# Patient Record
Sex: FEMALE | Race: WHITE | Employment: FULL TIME | URBAN - METROPOLITAN AREA
[De-identification: names, ages, dates, MRNs, and addresses within clinical notes are randomized per-mention and may not be internally consistent; named-entity substitution may affect disease eponyms.]

---

## 2018-07-13 ENCOUNTER — OFFICE VISIT (OUTPATIENT)
Dept: OBGYN CLINIC | Age: 38
End: 2018-07-13

## 2018-07-13 VITALS
HEIGHT: 66 IN | WEIGHT: 180 LBS | SYSTOLIC BLOOD PRESSURE: 114 MMHG | DIASTOLIC BLOOD PRESSURE: 68 MMHG | BODY MASS INDEX: 28.93 KG/M2

## 2018-07-13 DIAGNOSIS — Z86.711 HISTORY OF PULMONARY EMBOLISM: ICD-10-CM

## 2018-07-13 DIAGNOSIS — Z01.419 ENCOUNTER FOR GYNECOLOGICAL EXAMINATION: Primary | ICD-10-CM

## 2018-07-13 DIAGNOSIS — D68.51 HETEROZYGOUS FACTOR V LEIDEN MUTATION (HCC): ICD-10-CM

## 2018-07-13 DIAGNOSIS — N94.89 SUPPRESSION OF MENSES: ICD-10-CM

## 2018-07-13 RX ORDER — AZELASTINE HYDROCHLORIDE, FLUTICASONE PROPIONATE 137; 50 UG/1; UG/1
1 SPRAY, METERED NASAL 2 TIMES DAILY
COMMUNITY

## 2018-07-13 NOTE — PROGRESS NOTES
Annual exam ages 21-44    [address] [facility],[address] is a ,  41 y.o. female [facility] No LMP recorded. Patient is not currently having periods (Reason: IUD). .    She presents for her annual checkup. She is having no significant problems. With regard to the Gardasil vaccine, she is older than the FDA approved age to receive it. Menstrual status:    Her periods are nonexistent in flow. She denies dysmenorrhea. She reports no premenstrual symptoms. Contraception:    The current method of family planning is IUD. Mirena is due to be removed 2018. Pt desires to have it replaced. Sexual history:     She  reports that she currently engages in sexual activity and has had male partners. She reports using the following method of birth control/protection: IUD. Brenda Bellamy Medical conditions:    Since her last annual GYN exam was 18, she has not the following changes in her health history: none. Pap and Mammogram History:    Her most recent Pap smear was normal, HPV neg, obtained 12. The patient has never had a mammogram.    Breast Cancer History/Substance Abuse: negative    Past Medical History:   Diagnosis Date    Depression     Factor 5 Leiden mutation, heterozygous ([facility]     ITP (idiopathic thrombocytopenic purpura)     IUD (intrauterine device) in place     13 mirena    Postpartum depression     Pulmonary emboli (HCC)     Thromboembolus ([facility]     Unspecified diseases of blood and blood-forming organs      Past Surgical History:   Procedure Laterality Date    HX CYST REMOVAL      HX WISDOM TEETH EXTRACTION      HX WRIST FRACTURE TX         Current Outpatient Prescriptions   Medication Sig Dispense Refill    azelastine-fluticasone (DYMISTA) 137-50 mcg/spray spry by Nasal route.  sertraline (ZOLOFT) 100 mg tablet Take  by mouth daily. Allergies: Claritin [loratadine]; Ceftin [cefuroxime axetil];  Cefuroxime; Morphine; and Percocet [oxycodone-acetaminophen] Tobacco History:  reports that she has never smoked. She has never used smokeless tobacco.  Alcohol Abuse:  reports that she does not drink alcohol. Drug Abuse:  reports that she does not use illicit drugs.     Family Medical/Cancer History:   Family History   Problem Relation Age of Onset    Cancer Mother      breast    Cancer Maternal Aunt      breast        Review of Systems - History obtained from the patient  Constitutional: negative for weight loss, fever, night sweats  HEENT: negative for hearing loss, earache, congestion, snoring, sorethroat  CV: negative for chest pain, palpitations, edema  Resp: negative for cough, shortness of breath, wheezing  GI: negative for change in bowel habits, abdominal pain, black or bloody stools  : negative for frequency, dysuria, hematuria, vaginal discharge  MSK: negative for back pain, joint pain, muscle pain  Breast: negative for breast lumps, nipple discharge, galactorrhea  Skin :negative for itching, rash, hives  Neuro: negative for dizziness, headache, confusion, weakness  Psych: negative for anxiety, depression, change in mood  Heme/lymph: negative for bleeding, bruising, pallor    Physical Exam    Visit Vitals    /68    Ht 5' 6\" (1.676 m)    Wt 180 lb (81.6 kg)    BMI 29.05 kg/m2       Constitutional  · Appearance: well-nourished, well developed, alert, in no acute distress    HENT  · Head and Face: appears normal    Neck  · Inspection/Palpation: normal appearance, no masses or tenderness  · Lymph Nodes: no lymphadenopathy present  · Thyroid: gland size normal, nontender, no nodules or masses present on palpation    Chest  · Respiratory Effort: breathing unlabored  · Auscultation: normal breath sounds    Cardiovascular  · Heart:  · Auscultation: regular rate and rhythm without murmur    Breasts  · Inspection of Breasts: breasts symmetrical, no skin changes, no discharge present, nipple appearance normal, no skin retraction present  · Palpation of Breasts and Axillae: no masses present on palpation, no breast tenderness  · Axillary Lymph Nodes: no lymphadenopathy present    Gastrointestinal  · Abdominal Examination: abdomen non-tender to palpation, normal bowel sounds, no masses present  · Liver and spleen: no hepatomegaly present, spleen not palpable  · Hernias: no hernias identified    Genitourinary  · External Genitalia: normal appearance for age, no discharge present, no tenderness present, no inflammatory lesions present, no masses present, no atrophy present  · Vagina: normal vaginal vault without central or paravaginal defects, no discharge present, no inflammatory lesions present, no masses present  · Bladder: non-tender to palpation  · Urethra: appears normal  · Cervix: normal   · Uterus: normal size, shape and consistency  · Adnexa: no adnexal tenderness present, no adnexal masses present  · Perineum: perineum within normal limits, no evidence of trauma, no rashes or skin lesions present  · Anus: anus within normal limits, no hemorrhoids present  · Inguinal Lymph Nodes: no lymphadenopathy present    Skin  · General Inspection: no rash, no lesions identified    Neurologic/Psychiatric  · Mental Status:  · Orientation: grossly oriented to person, place and time  · Mood and Affect: mood normal, affect appropriate    . Assessment:  Routine gynecologic examination  Her current medical status is satisfactory with no evidence of significant gynecologic issues.   Hx of DVT/PE  Suppression of menses  Plan:  Counseled re: diet, exercise, healthy lifestyle  Return for yearly wellness visits  Pap/HPV  Switch out Mirena next month

## 2018-07-18 LAB
CYTOLOGIST CVX/VAG CYTO: NORMAL
CYTOLOGY CVX/VAG DOC THIN PREP: NORMAL
DX ICD CODE: NORMAL
HPV I/H RISK 1 DNA CVX QL PROBE+SIG AMP: NEGATIVE
Lab: NORMAL
OTHER STN SPEC: NORMAL
PATH REPORT.FINAL DX SPEC: NORMAL
STAT OF ADQ CVX/VAG CYTO-IMP: NORMAL

## 2018-07-26 ENCOUNTER — OFFICE VISIT (OUTPATIENT)
Dept: OBGYN CLINIC | Age: 38
End: 2018-07-26

## 2018-07-26 ENCOUNTER — TELEPHONE (OUTPATIENT)
Dept: OBGYN CLINIC | Age: 38
End: 2018-07-26

## 2018-07-26 VITALS — WEIGHT: 180.2 LBS | HEIGHT: 66 IN | BODY MASS INDEX: 28.96 KG/M2

## 2018-07-26 DIAGNOSIS — Z30.433 ENCOUNTER FOR IUD REMOVAL AND REINSERTION: Primary | ICD-10-CM

## 2018-07-26 NOTE — PATIENT INSTRUCTIONS
Learning About Birth Control: Intrauterine Device (IUD)  What is an intrauterine device (IUD)? The intrauterine device (IUD) is used to prevent pregnancy. It's a small, plastic, T-shaped device. Your doctor places the IUD in your uterus. You have a choice between a hormonal IUD and a copper IUD. The hormonal IUD can prevent pregnancy for 3 to 5 years, depending on which IUD is used. Once you have it, you don't have to do anything else to prevent pregnancy. The copper IUD can prevent pregnancy for 10 years. Once you have it, you don't have to do anything else to prevent pregnancy. A string tied to the end of the IUD hangs down through the opening of the uterus (called the cervix) into the vagina. You can check that the IUD is in place by feeling for the string. The IUD usually stays in the uterus until your doctor removes it. How well does it work? In the first year of use:  · When the hormonal IUD is used exactly as directed, fewer than 1 woman out of 100 has an unplanned pregnancy. · When the copper IUD is used exactly as directed, fewer than 1 woman out of 100 has an unplanned pregnancy. Be sure to tell your doctor about any health problems you have or medicines you take. He or she can help you choose the birth control method that is right for you. What are the advantages of an IUD? · An IUD is one of the most effective methods of birth control. · It prevents pregnancy for 3 to 10 years, depending on the type. You don't have to worry about birth control during this time. · It's safe to use while breastfeeding. · IUDs don't contain estrogen. So you can use an IUD if you don't want to take estrogen or can't take estrogen because you have certain health problems or concerns. · An IUD is convenient. It is always providing birth control. You don't need to remember to take a pill or get a shot. You don't have to interrupt sex to protect against pregnancy.   · A hormonal IUD may reduce heavy bleeding and cramping. What are the disadvantages of an IUD? · An IUD doesn't protect against sexually transmitted infections (STIs), such as herpes or HIV/AIDS. If you aren't sure if your sex partner might have an STI, use a condom to protect against disease. · A copper IUD may cause periods with more bleeding and cramping. · You have to see a doctor to have an IUD inserted and removed. · You have to check to see if the string is in place. Where can you learn more? Go to http://piotr-christina.info/. Enter T994 in the search box to learn more about \"Learning About Birth Control: Intrauterine Device (IUD). \"  Current as of: November 21, 2017  Content Version: 11.7  © 3761-3614 [employer]. Care instructions adapted under license by [employer] (which disclaims liability or warranty for this information). If you have questions about a medical condition or this instruction, always ask your healthcare professional. [address] any warranty or liability for your use of this information.

## 2018-07-26 NOTE — TELEPHONE ENCOUNTER
Message left at 9:20am      41year old patient last seen in the office on 7/13/18. Patient calling to ask what medication to take prior to her IUD insertion. This nurse called the patient and advised to take two Aleve one hour prior to the procedure if not allergic to the medication. Patient verbalized understanding.

## 2018-07-26 NOTE — PROGRESS NOTES
[facility]  OFFICE PROCEDURE PROGRESS NOTE        Chart reviewed for the following:   Nikki Bravo LPN, have reviewed the History, Physical and updated the Allergic reactions for [address] performed immediately prior to start of procedure:   IHuma LPN, have performed the following reviews on [address] [place] [address],[address] [place] [address] prior to the start of the procedure:            * Patient was identified by name and date of birth   * Agreement on procedure being performed was verified  * Risks and Benefits explained to the patient  * Procedure site verified and marked as necessary  * Patient was positioned for comfort  * Consent was signed and verified     Time: 3:49 PM        Date of procedure: 2018    Procedure performed by:  Magui Gunderson MD    How tolerated by patient: tolerated the procedure well with no complications    Post Procedural Pain Scale: 2 - Hurts Little Bit    Comments: none  IUD REMOVAL  Indications for Removal:  [address] [place] [address],[address] [place] [address] is a ,  41 y.o. female [facility] whose No LMP recorded. Patient is not currently having periods (Reason: IUD). Katarzyna Kruse who presents today for IUD removal. Her current IUD was placed years ago. She has not had  problems with the IUD. She requests removal of the IUD because the IUD effectiveness has . The IUD removal procedure was discussed with the patient and she had no further questions. Procedure: The patient was placed in a dorsal lithotomy position and appropriately draped. On bimanual exam the uterus was anterior and normal in size with no tenderness present. A speculum exam was performed and the cervix was visualized. The cervix was prepped with zephiran solution. The IUD string was visualized. Using ring forceps , the string was grasped and the IUD removed intact. The IUD was shown to the patient. Mirena IUD INSERTION  Indications:  [address] [place] [address],[address] [place] [address] is a ,  41 y.o. female [facility] No LMP recorded. Patient is not currently having periods (Reason: IUD). Her LMP was normal in duration and amount of flow. She  presents for insertion of an IUD. The risks, benefits and alternatives of IUD insertion were discussed in detail at last visit. She also has reviewed Mirena information. She has elected to proceed with the insertion today and she states she has no further questions. Procedure: The pelvic exam revealed normal external genitalia. On bimanual exam the uterus was anteverted and normal in size with no tenderness present. A speculum was inserted into the vagina and the cervix was visualized. The cervix was prepped with zephiran solution. The anterior lip of the cervix was grasped with a single toothed tenaculum. The uterus was sounded with a Real sound to 10 centimeters. A Mirena was then inserted without difficulty. The string was cut to 3 centimeters. She experienced a mild  amount of cramping. Post Procedure Status:   She tolerated the procedure with mild discomfort. The patient was observed for 10 minutes after the insertion. There were no complications. Patient was discharged in stable condition. The patient received Mirena lot number [id number].     Disc bleeding, infection, expulsion  FU with US in 4 weeks

## 2018-07-27 ENCOUNTER — OFFICE VISIT (OUTPATIENT)
Dept: INTERNAL MEDICINE CLINIC | Age: 38
End: 2018-07-27

## 2018-07-27 VITALS
WEIGHT: 179 LBS | TEMPERATURE: 98.2 F | OXYGEN SATURATION: 98 % | RESPIRATION RATE: 18 BRPM | BODY MASS INDEX: 28.77 KG/M2 | DIASTOLIC BLOOD PRESSURE: 65 MMHG | HEART RATE: 92 BPM | HEIGHT: 66 IN | SYSTOLIC BLOOD PRESSURE: 112 MMHG

## 2018-07-27 DIAGNOSIS — D68.51 HETEROZYGOUS FACTOR V LEIDEN MUTATION (HCC): ICD-10-CM

## 2018-07-27 DIAGNOSIS — Z86.718 HISTORY OF THROMBOEMBOLISM: ICD-10-CM

## 2018-07-27 DIAGNOSIS — Z80.3 FH: BREAST CANCER IN FIRST DEGREE RELATIVE: ICD-10-CM

## 2018-07-27 DIAGNOSIS — Z86.2 HISTORY OF ITP: ICD-10-CM

## 2018-07-27 DIAGNOSIS — Z00.00 PREVENTATIVE HEALTH CARE: Primary | ICD-10-CM

## 2018-07-27 DIAGNOSIS — Z91.018 FOOD ALLERGY: ICD-10-CM

## 2018-07-27 RX ORDER — RIVAROXABAN 20 MG/1
20 TABLET, FILM COATED ORAL
Qty: 30 TAB | Refills: 0
Start: 2018-07-27

## 2018-07-27 NOTE — PROGRESS NOTES
Brandon Perkins is a 41 y.o. female New patient to my practice, referred to me by self.   her prior medical care has been from [facility] who went part-time. she works as [employer] . 
Presenting for her annual checkup and follow-up She is followed closely by GYN Dr. Yoli Sherwood. Had Mirena placed yesterday. She is  to Yasmine Ellison, has 2 children. Seeing a nutritionist and has lost 30 lb. Still, was told nutritionist asks if \"something else is going on\" since she has not lost enough. \"Stroger You\". On calorie-restricted diet based on her activity She has a history of thromboembolism ×2. Heterozygous factor V Leiden mutation. First episode was in 2008 while she was on oral contraceptive therapy. Second episode was in 2012 when she was traveling well taking prophylactic low-dose of Lovenox. Consulted Dr. Mee Angelo at [address] [place] [address] and now is not on chronic anticoagulation therapy, but takes Xarelto with travel only. She does not smoke and has avoided hormonal contraception since that time. Denies any symptoms of concern today. She does have a history of ITP. Unclear origin, but did start Claritin around the time that it began that she is aware that medication. Last breast exam: last week Last PAP/pelvic: last week Last colonoscopy: none Last DEXA:  
Health Maintenance Topic Date Due  Influenza Age 5 to Adult  08/01/2018  DTaP/Tdap/Td series (2 - Td) 05/01/2023  PAP AKA CERVICAL CYTOLOGY  07/13/2023 Exercise: very active Diet: generally follows a low fat low cholesterol diet Vaccinations reviewed Immunization History Administered Date(s) Administered  Tdap 05/01/2013 Allergies: Claritin [loratadine]; Ceftin [cefuroxime axetil]; Cefuroxime; and Percocet [oxycodone-acetaminophen] Current Outpatient Prescriptions Medication Sig  XARELTO 20 mg tab tablet Take 1 Tab by mouth daily (with breakfast). As needed for travel. Per Dr. Mee Agnelo.   
 azelastine-fluticasone (DYMISTA) 137-50 mcg/spray spry 1 Spray by Nasal route two (2) times a day. No current facility-administered medications for this visit. has a past medical history of Factor 5 Leiden mutation, heterozygous ([facility] FH: breast cancer in first degree relative; Food allergy; History of ITP (1998); History of thromboembolism; IUD (intrauterine device) in place; Postpartum depression; Pulmonary emboli ([facility] (09/2008); Right leg DVT ([facility] (10/2012); and Spontaneous vaginal delivery. She also has no past medical history of Systemic lupus erythematosus ([facility] Past Surgical History:  
Procedure Laterality Date  HX CYST REMOVAL Left   
 left wrist ganglion  HX WISDOM TEETH EXTRACTION Social History Social History  Marital status:  Spouse name: Margaret Frederick  Number of children: 2  
 Years of education: N/A Occupational History [place] [id number] Social History Main Topics  Smoking status: Never Smoker  Smokeless tobacco: Never Used  Alcohol use No  
 Drug use: No  
 Sexual activity: Yes  
  Partners: Male Birth control/ protection: IUD Other Topics Concern  Not on file Social History Narrative Family History Problem Relation Age of Onset  Cancer Mother   
  breast.  2x age 64, 70  
 Cancer Maternal Aunt   
  breast  
 Prostate Cancer Maternal Grandfather  Strabismus Paternal Grandfather  Stroke Paternal Grandfather Review of Systems - History obtained from the patient General ROS: negative for - night sweats, weight gain or weight loss Physical exam 
Blood pressure 145/81, pulse 92, temperature 98.2 °F (36.8 °C), temperature source Oral, resp. rate 18, height 5' 6\" (1.676 m), weight 179 lb (81.2 kg), SpO2 98 %. Wt Readings from Last 3 Encounters:  
07/27/18 179 lb (81.2 kg) 07/26/18 180 lb 3.2 oz (81.7 kg) 07/13/18 180 lb (81.6 kg) she appears well, alert and oriented x 3, pleasant and cooperative. Vitals as noted. No rashes or significant lesions. Neck supple and free of adenopathy, or masses. No thyromegaly or carotid bruits. Cranial nerves normal. Lungs are clear to auscultation. Heart sounds are normal with no murmurs, clicks, gallops or rubs. Abdomen is soft, non- tender, with no masses or organomegaly. Extremities, peripheral pulses and reflexes are normal.  . 
 
Diagnoses and all orders for this visit: 1. Preventative health care -     LIPID PANEL 
-     CBC WITH AUTOMATED DIFF 
-     METABOLIC PANEL, COMPREHENSIVE 
-     TSH 3RD GENERATION 
-     VITAMIN B12 
-     VITAMIN D, 25 HYDROXY 2. Heterozygous factor V Leiden mutation ([facility] 3. History of thromboembolism Asymptomatic. Using Xarelto as needed. Follow-up with Dr. Momo Reid. -     XARELTO 20 mg tab tablet; Take 1 Tab by mouth daily (with breakfast). As needed for travel. Per Dr. Momo Reid. 4. FH: breast cancer in first degree relative Mother and maternal aunt. She will ask them if they had any genetic testing. Start screening mammograms at age 38. Both of her relatives were diagnosed at the age of 61. 
 
11. History of ITP Asymptomatic. Repeat CBC 6. Food allergy Several food allergies. Followed up by allergist.  Check labs as above to look for any evidence of malabsorption. She is on somewhat of a restrictive diet but is doing well. The patient is asked to make an attempt to improve diet and exercise patterns Return for yearly wellness visits

## 2018-07-27 NOTE — PATIENT INSTRUCTIONS

## 2018-07-27 NOTE — MR AVS SNAPSHOT
[address] [facility] 
 
 
 [address] Tejolyn Pee 1007 [facility] 
[phone] Patient: Jessica Crain MRN: [id number] Visit Information Date & Time Provider Department Dept. Phone Encounter #  
 7/27/2018  1:45 PM Asha Perez MD [facility] [address] [id number] Your Appointments 8/23/2018  2:30 PM  
ULTRASOUND with DOUG Aaron ([address]) Appt Note: to see [address] after US for Mirena check [address] [place] [address]  
[phone]  
  
   
 [address]  
  
    
 8/23/2018  2:50 PM  
ESTABLISHED PATIENT with Pennie Crowley MD  
[facility] ([address]) Appt Note: to see [address] after US for Mirena check 2505 [facility] [address] [facility] [address] [facility] [place]alee Pichardo [address]  
[facility] [address] Upcoming Health Maintenance Date Due Influenza Age 5 to Adult 8/1/2018 DTaP/Tdap/Td series (2 - Td) 5/1/2023 PAP AKA CERVICAL CYTOLOGY 7/13/2023 Allergies as of 7/27/2018  Review Complete On: 7/27/2018 By: Asha Perez MD  
  
 Severity Noted Reaction Type Reactions Claritin [Loratadine] High 10/15/2012   Intolerance Unknown (comments) Pt had ITP in 1998. Pt stated that she believes it was from the claritin. Ceftin [Cefuroxime Axetil]  09/27/2012    Nausea Only Cefuroxime  11/02/2017    Nausea and Vomiting Migraines Percocet [Oxycodone-acetaminophen]  09/27/2012    Nausea Only Severe nausea Current Immunizations  Reviewed on 7/27/2018 Name Date Tdap 5/1/2013 12:10 PM  
  
 Reviewed by Asha Perez MD on 7/27/2018 at  2:29 PM  
You Were Diagnosed With   
  
 Codes Comments Preventative health care    -  Primary ICD-10-CM: Z00.00 ICD-9-CM: V70.0 Heterozygous factor V Leiden mutation [facility])     ICD-10-CM: J39.68 
ICD-9-CM: 289.81 History of thromboembolism     ICD-10-CM: V32.975 ICD-9-CM: V12.51 FH: breast cancer in first degree relative     ICD-10-CM: Z80.3 ICD-9-CM: V16.3 History of ITP     ICD-10-CM: Z86.2 ICD-9-CM: V12.3 Food allergy     ICD-10-CM: M59.856 
ICD-9-CM: V15.05 Vitals BP Pulse Temp Resp Height(growth percentile) Weight(growth percentile) 112/65 (BP 1 Location: Left arm, BP Patient Position: Sitting) 92 98.2 °F (36.8 °C) (Oral) 18 5' 6\" (1.676 m) 179 lb (81.2 kg) SpO2 BMI OB Status Smoking Status 98% 28.89 kg/m2 IUD Never Smoker Vitals History BMI and BSA Data Body Mass Index Body Surface Area  
 28.89 kg/m 2 1.94 m 2 Preferred Pharmacy Pharmacy Name Phone [address], Stella Elmer [address] AT [facility]  [facility] [phone] Your Updated Medication List  
  
   
This list is accurate as of 7/27/18  2:33 PM.  Always use your most recent med list.  
  
  
  
  
 Jes Edgardo 137-50 mcg/spray Spry Generic drug:  azelastine-fluticasone 1 Spray by Nasal route two (2) times a day. XARELTO 20 mg Tab tablet Generic drug:  rivaroxaban Take 1 Tab by mouth daily (with breakfast). As needed for travel. Per Dr. Elizabet Slaughter. We Performed the Following CBC WITH AUTOMATED DIFF [89414 CPT(R)] LIPID PANEL [92894 CPT(R)] METABOLIC PANEL, COMPREHENSIVE [70662 CPT(R)] TSH 3RD GENERATION [28050 CPT(R)] VITAMIN B12 [id number] CPT(R)] VITAMIN D, 25 HYDROXY [id number] CPT(R)] Patient Instructions Well Visit, Ages 25 to 48: Care Instructions Your Care Instructions Physical exams can help you stay healthy. Your doctor has checked your overall health and may have suggested ways to take good care of yourself. He or she also may have recommended tests.  At home, you can help prevent illness with healthy eating, regular exercise, and other steps. Follow-up care is a key part of your treatment and safety. Be sure to make and go to all appointments, and call your doctor if you are having problems. It's also a good idea to know your test results and keep a list of the medicines you take. How can you care for yourself at home? · Reach and stay at a healthy weight. This will lower your risk for many problems, such as obesity, diabetes, heart disease, and high blood pressure. · Get at least 30 minutes of physical activity on most days of the week. Walking is a good choice. You also may want to do other activities, such as running, swimming, cycling, or playing tennis or team sports. Discuss any changes in your exercise program with your doctor. · Do not smoke or allow others to smoke around you. If you need help quitting, talk to your doctor about stop-smoking programs and medicines. These can increase your chances of quitting for good. · Talk to your doctor about whether you have any risk factors for sexually transmitted infections (STIs). Having one sex partner (who does not have STIs and does not have sex with anyone else) is a good way to avoid these infections. · Use birth control if you do not want to have children at this time. Talk with your doctor about the choices available and what might be best for you. · Protect your skin from too much sun. When you're outdoors from 10 a.m. to 4 p.m., stay in the shade or cover up with clothing and a hat with a wide brim. Wear sunglasses that block UV rays. Even when it's cloudy, put broad-spectrum sunscreen (SPF 30 or higher) on any exposed skin. · See a dentist one or two times a year for checkups and to have your teeth cleaned. · Wear a seat belt in the car. · Drink alcohol in moderation, if at all. That means no more than 2 drinks a day for men and 1 drink a day for women. Follow your doctor's advice about when to have certain tests. These tests can spot problems early. For everyone · Cholesterol. Have the fat (cholesterol) in your blood tested after age 21. Your doctor will tell you how often to have this done based on your age, family history, or other things that can increase your risk for heart disease. · Blood pressure. Have your blood pressure checked during a routine doctor visit. Your doctor will tell you how often to check your blood pressure based on your age, your blood pressure results, and other factors. · Vision. Talk with your doctor about how often to have a glaucoma test. 
· Diabetes. Ask your doctor whether you should have tests for diabetes. · Colon cancer. Have a test for colon cancer at age 48. You may have one of several tests. If you are younger than 48, you may need a test earlier if you have any risk factors. Risk factors include whether you already had a precancerous polyp removed from your colon or whether your parent, brother, sister, or child has had colon cancer. For women · Breast exam and mammogram. Talk to your doctor about when you should have a clinical breast exam and a mammogram. Medical experts differ on whether and how often women under 50 should have these tests. Your doctor can help you decide what is right for you. · Pap test and pelvic exam. Begin Pap tests at age 24. A Pap test is the best way to find cervical cancer. The test often is part of a pelvic exam. Ask how often to have this test. 
· Tests for sexually transmitted infections (STIs). Ask whether you should have tests for STIs. You may be at risk if you have sex with more than one person, especially if your partners do not wear condoms. For men · Tests for sexually transmitted infections (STIs). Ask whether you should have tests for STIs. You may be at risk if you have sex with more than one person, especially if you do not wear a condom. · Testicular cancer exam. Ask your doctor whether you should check your testicles regularly. · Prostate exam. Talk to your doctor about whether you should have a blood test (called a PSA test) for prostate cancer. Experts differ on whether and when men should have this test. Some experts suggest it if you are older than 39 and are -American or have a father or brother who got prostate cancer when he was younger than 72. When should you call for help? Watch closely for changes in your health, and be sure to contact your doctor if you have any problems or symptoms that concern you. Where can you learn more? Go to http://piotr-christina.info/. Enter P072 in the search box to learn more about \"Well Visit, Ages 25 to 48: Care Instructions. \" Current as of: May 16, 2017 Content Version: 11.7 © 6930-8822 [employer]. Care instructions adapted under license by [employer] (which disclaims liability or warranty for this information). If you have questions about a medical condition or this instruction, always ask your healthcare professional. [place] [address] any warranty or liability for your use of this information. Introducing [facility] & HE[facility]! Dear Silke Strange: 
Thank you for requesting a [employer] account. Our records indicate that you already have an active [employer] account. You can access your account anytime at [email or web address]. [place] [facility]/[employer] Did you know that you can access your hospital and ER discharge instructions at any time in [employer]? You can also review all of your test results from your hospital stay or ER visit. Additional Information If you have questions, please visit the Frequently Asked Questions section of the [employer] website at [email or web address]. [place] [facility]/[employer]/. Remember, [employer] is NOT to be used for urgent needs. For medical emergencies, dial 911. Now available from your iPhone and Android! Please provide this summary of care documentation to your next provider. Your primary care clinician is listed as Miguel Ángel Dejesus. If you have any questions after today's visit, please call [phone].

## 2018-07-28 LAB
25(OH)D3+25(OH)D2 SERPL-MCNC: 33.1 NG/ML (ref 30–100)
ALBUMIN SERPL-MCNC: 4.1 G/DL (ref 3.5–5.5)
ALBUMIN/GLOB SERPL: 1.6 {RATIO} (ref 1.2–2.2)
ALP SERPL-CCNC: 40 IU/L (ref 39–117)
ALT SERPL-CCNC: 12 IU/L (ref 0–32)
AST SERPL-CCNC: 17 IU/L (ref 0–40)
BASOPHILS # BLD AUTO: 0 X10E3/UL (ref 0–0.2)
BASOPHILS NFR BLD AUTO: 0 %
BILIRUB SERPL-MCNC: 0.5 MG/DL (ref 0–1.2)
BUN SERPL-MCNC: 15 MG/DL (ref 6–20)
BUN/CREAT SERPL: 17 (ref 9–23)
CALCIUM SERPL-MCNC: 9 MG/DL (ref 8.7–10.2)
CHLORIDE SERPL-SCNC: 104 MMOL/L (ref 96–106)
CHOLEST SERPL-MCNC: 170 MG/DL (ref 100–199)
CO2 SERPL-SCNC: 23 MMOL/L (ref 20–29)
CREAT SERPL-MCNC: 0.88 MG/DL (ref 0.57–1)
EOSINOPHIL # BLD AUTO: 0.1 X10E3/UL (ref 0–0.4)
EOSINOPHIL NFR BLD AUTO: 1 %
ERYTHROCYTE [DISTWIDTH] IN BLOOD BY AUTOMATED COUNT: 13.3 % (ref 12.3–15.4)
GLOBULIN SER CALC-MCNC: 2.5 G/DL (ref 1.5–4.5)
GLUCOSE SERPL-MCNC: 81 MG/DL (ref 65–99)
HCT VFR BLD AUTO: 40.9 % (ref 34–46.6)
HDLC SERPL-MCNC: 63 MG/DL
HGB BLD-MCNC: 13.7 G/DL (ref 11.1–15.9)
IMM GRANULOCYTES # BLD: 0 X10E3/UL (ref 0–0.1)
IMM GRANULOCYTES NFR BLD: 0 %
INTERPRETATION, 910389: NORMAL
LDLC SERPL CALC-MCNC: 98 MG/DL (ref 0–99)
LYMPHOCYTES # BLD AUTO: 2.2 X10E3/UL (ref 0.7–3.1)
LYMPHOCYTES NFR BLD AUTO: 31 %
MCH RBC QN AUTO: 31.4 PG (ref 26.6–33)
MCHC RBC AUTO-ENTMCNC: 33.5 G/DL (ref 31.5–35.7)
MCV RBC AUTO: 94 FL (ref 79–97)
MONOCYTES # BLD AUTO: 0.3 X10E3/UL (ref 0.1–0.9)
MONOCYTES NFR BLD AUTO: 5 %
NEUTROPHILS # BLD AUTO: 4.3 X10E3/UL (ref 1.4–7)
NEUTROPHILS NFR BLD AUTO: 63 %
PLATELET # BLD AUTO: 197 X10E3/UL (ref 150–379)
POTASSIUM SERPL-SCNC: 4.2 MMOL/L (ref 3.5–5.2)
PROT SERPL-MCNC: 6.6 G/DL (ref 6–8.5)
RBC # BLD AUTO: 4.36 X10E6/UL (ref 3.77–5.28)
SODIUM SERPL-SCNC: 142 MMOL/L (ref 134–144)
TRIGL SERPL-MCNC: 46 MG/DL (ref 0–149)
TSH SERPL DL<=0.005 MIU/L-ACNC: 1.36 UIU/ML (ref 0.45–4.5)
VIT B12 SERPL-MCNC: 799 PG/ML (ref 232–1245)
VLDLC SERPL CALC-MCNC: 9 MG/DL (ref 5–40)
WBC # BLD AUTO: 6.9 X10E3/UL (ref 3.4–10.8)

## 2018-09-04 ENCOUNTER — OFFICE VISIT (OUTPATIENT)
Dept: OBGYN CLINIC | Age: 38
End: 2018-09-04

## 2018-09-04 VITALS
DIASTOLIC BLOOD PRESSURE: 66 MMHG | HEIGHT: 66 IN | BODY MASS INDEX: 28.77 KG/M2 | WEIGHT: 179 LBS | SYSTOLIC BLOOD PRESSURE: 108 MMHG

## 2018-09-04 DIAGNOSIS — Z30.431 IUD CHECK UP: Primary | ICD-10-CM

## 2018-09-04 NOTE — PROGRESS NOTES
This is a follow-up visit for Michaela Lorenzo is a ,  41 y.o. female [facility] whose No LMP recorded. Patient is not currently having periods (Reason: IUD). was on . She had an Mirena IUD placed six weeks ago. Since the IUD placement, the patient has not had any unusual complaints. She has had some mild non-menstrual bleeding. She describes having light amount of blood-tinged discharge which occurred at the insertion of the Mirena. She has not had any significant  pain. She has had no fever. Associated signs and symptoms: she denies dyspareunia, expulsion, heavy bleeding, increased pain, fever, and pelvic pain. Ultrasound was done today and revealed appropriate placement of the IUD in the endometrial cavity. TRANSVAGINAL ULTRASOUND PERFORMED  UTERUS IS ANTEVERTED, NORMAL IN SIZE AND ECHOGENICITY. ENDOMETRIUM MEASURES 2-3MM IN THICKNESS. NO EVIDENCE OF MASSES OR ABNORMALITIES ARE SEEN. IUD IS SEEN IN THE PROPER POSITION WITHIN THE ENDOMETRIAL CAVITY IN THE UTERINE FUNDUS. RIGHT OVARY APPEARS WITHIN NORMAL LIMITS. LEFT OVARY APPEARS WITHIN NORMAL LIMITS. A FOLLICLE IS SEEN TODAY. NO FREE FLUID SEEN IN THE CDS. Past Medical History:   Diagnosis Date    Factor 5 Leiden mutation, heterozygous ([address]     single mutation. consulted Dr. Kennedi Franco at [facility]  and Dr. Emi Waddell FH: breast cancer in first degree relative     mother 58, 70. [facility] allergy     Dr. Alanis Wakefield. 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 tx gamma globulin    History of thromboembolism      on OCP,  while pregnant. now uses Xarelto prn travel    IUD (intrauterine device) in place 2018      Dr. Monae Allen. mirena    Postpartum depression     Pulmonary emboli ([address] 2008    due to OCP    Right leg DVT ([address] 10/2012    while pregnant and traveling while taking low-dose lovenox.       Spontaneous vaginal delivery     2010     Past Surgical History:   Procedure Laterality Date    HX CYST REMOVAL Left     left wrist ganglion    HX WISDOM TEETH EXTRACTION       Social History     Occupational History    [employer]     Social History Main Topics    Smoking status: Never Smoker    Smokeless tobacco: Never Used    Alcohol use No    Drug use: No    Sexual activity: Yes     Partners: Male     Birth control/ protection: IUD     Family History   Problem Relation Age of Onset    Cancer Mother 64     breast.  2x age 64, 70    Breast Cancer Maternal Aunt 61    Prostate Cancer Maternal Grandfather     Strabismus Paternal Grandfather     Stroke Paternal Grandfather     Breast Cancer Maternal Aunt 61     \"aggressive type\"       Allergies   Allergen Reactions    Claritin [Loratadine] Unknown (comments)     Pt had ITP in 1998. Pt stated that she believes it was from the claritin.  Ceftin [Cefuroxime Axetil] Nausea Only    Cefuroxime Nausea and Vomiting     Migraines    Percocet [Oxycodone-Acetaminophen] Nausea Only     Severe nausea     Prior to Admission medications    Medication Sig Start Date End Date Taking? Authorizing Provider   XARELTO 20 mg tab tablet Take 1 Tab by mouth daily (with breakfast). As needed for travel. Per Dr. Alayna Ayala. 7/27/18  Yes Joel Collins MD   azelastine-fluticasone [facility]) 137-50 mcg/spray spry 1 Spray by Nasal route two (2) times a day.    Yes Historical Provider        Review of Systems: History obtained from the patient  Constitutional: negative for weight loss, fever, night sweats  Breast: negative for breast lumps, nipple discharge, galactorrhea  GI: negative for change in bowel habits, abdominal pain, black or bloody stools  : negative for frequency, dysuria, hematuria, vaginal discharge  MSK: negative for back pain, joint pain, muscle pain  Skin: negative for itching, rash, hives  Psych: negative for anxiety, depression, change in mood      Objective:  Visit Vitals    /66    Ht 5' 6\" (1.676 m)    Wt 179 lb (81.2 kg)    BMI 28.89 kg/m2       Physical Exam:   PHYSICAL EXAMINATION    Constitutional  · Appearance: well-nourished, well developed, alert, in no acute distress    Gastrointestinal  · Abdominal Examination: abdomen non-tender to palpation, normal bowel sounds, no masses present  · Liver and spleen: no hepatomegaly present, spleen not palpable  · Hernias: no hernias identified    Genitourinary  · External Genitalia: normal appearance for age, no discharge present, no tenderness present, no inflammatory lesions present, no masses present, no atrophy present  · Vagina: normal vaginal vault without central or paravaginal defects, no discharge present, no inflammatory lesions present, no masses present  · Bladder: non-tender to palpation  · Urethra: appears normal  · Cervix: normal with IUD string visible and appropriate length   · Uterus: normal size, shape and consistency  · Adnexa: no adnexal tenderness present, no adnexal masses present  · Perineum: perineum within normal limits, no evidence of trauma, no rashes or skin lesions present    Skin  · General Inspection: no rash, no lesions identified    Neurologic/Psychiatric  · Mental Status:  · Orientation: grossly oriented to person, place and time  · Mood and Affect: mood normal, affect appropriate    Assessment:   Doing well with Mirena  Mother and her 2 sisters with  breast ca - getting gene tested    Plan:   AE  Will get screening mammo

## 2018-09-04 NOTE — PATIENT INSTRUCTIONS
Learning About Birth Control: Intrauterine Device (IUD)  What is an intrauterine device (IUD)? The intrauterine device (IUD) is used to prevent pregnancy. It's a small, plastic, T-shaped device. Your doctor places the IUD in your uterus. You have a choice between a hormonal IUD and a copper IUD. The hormonal IUD can prevent pregnancy for 3 to 5 years, depending on which IUD is used. Once you have it, you don't have to do anything else to prevent pregnancy. The copper IUD can prevent pregnancy for 10 years. Once you have it, you don't have to do anything else to prevent pregnancy. A string tied to the end of the IUD hangs down through the opening of the uterus (called the cervix) into the vagina. You can check that the IUD is in place by feeling for the string. The IUD usually stays in the uterus until your doctor removes it. How well does it work? In the first year of use:  · When the hormonal IUD is used exactly as directed, fewer than 1 woman out of 100 has an unplanned pregnancy. · When the copper IUD is used exactly as directed, fewer than 1 woman out of 100 has an unplanned pregnancy. Be sure to tell your doctor about any health problems you have or medicines you take. He or she can help you choose the birth control method that is right for you. What are the advantages of an IUD? · An IUD is one of the most effective methods of birth control. · It prevents pregnancy for 3 to 10 years, depending on the type. You don't have to worry about birth control during this time. · It's safe to use while breastfeeding. · IUDs don't contain estrogen. So you can use an IUD if you don't want to take estrogen or can't take estrogen because you have certain health problems or concerns. · An IUD is convenient. It is always providing birth control. You don't need to remember to take a pill or get a shot. You don't have to interrupt sex to protect against pregnancy.   · A hormonal IUD may reduce heavy bleeding and cramping. What are the disadvantages of an IUD? · An IUD doesn't protect against sexually transmitted infections (STIs), such as herpes or HIV/AIDS. If you aren't sure if your sex partner might have an STI, use a condom to protect against disease. · A copper IUD may cause periods with more bleeding and cramping. · You have to see a doctor to have an IUD inserted and removed. · You have to check to see if the string is in place. Where can you learn more? Go to http://piotr-christina.info/. Enter U290 in the search box to learn more about \"Learning About Birth Control: Intrauterine Device (IUD). \"  Current as of: November 21, 2017  Content Version: 11.7  © 8129-4896 [employer]. Care instructions adapted under license by [employer] (which disclaims liability or warranty for this information). If you have questions about a medical condition or this instruction, always ask your healthcare professional. [address] any warranty or liability for your use of this information.

## 2018-11-28 ENCOUNTER — OFFICE VISIT (OUTPATIENT)
Dept: INTERNAL MEDICINE CLINIC | Age: 38
End: 2018-11-28

## 2018-11-28 VITALS
HEIGHT: 66 IN | TEMPERATURE: 98 F | OXYGEN SATURATION: 98 % | WEIGHT: 191 LBS | RESPIRATION RATE: 12 BRPM | BODY MASS INDEX: 30.7 KG/M2 | SYSTOLIC BLOOD PRESSURE: 119 MMHG | DIASTOLIC BLOOD PRESSURE: 82 MMHG | HEART RATE: 95 BPM

## 2018-11-28 DIAGNOSIS — H65.193 OTHER ACUTE NONSUPPURATIVE OTITIS MEDIA OF BOTH EARS, RECURRENCE NOT SPECIFIED: Primary | ICD-10-CM

## 2018-11-28 RX ORDER — AMOXICILLIN 875 MG/1
875 TABLET, FILM COATED ORAL 2 TIMES DAILY
COMMUNITY

## 2018-11-28 RX ORDER — PSEUDOEPHEDRINE HCL 30 MG
TABLET ORAL
COMMUNITY

## 2018-11-28 RX ORDER — LEVOFLOXACIN 500 MG/1
500 TABLET, FILM COATED ORAL DAILY
Qty: 10 TAB | Refills: 0 | Status: SHIPPED | OUTPATIENT
Start: 2018-11-28

## 2018-11-28 NOTE — PROGRESS NOTES
Chief Complaint   Patient presents with    Ear Pain    Nasal Congestion     This patient is a patient of Dr. Edy Lucas. She presents today with history of ear infection which has not resolved since November 21. She reports that 4 days after symptoms occurred she went to [facility] and was diagnosed with otitis. She was initiated on amoxicillin and was given \"liquid gold\". Patient reports the medications worked for about a day but then symptoms resumed. She initially had low-grade fever but no longer fever. She reports pain more in the right ear greater than the left. She has an adverse reaction to cephalosporins however she does not have a true allergy. .      Past Medical History:   Diagnosis Date    Factor 5 Leiden mutation, heterozygous ([address]     single mutation. consulted Dr. Jean-Paul Stone at [facility] 2013 and Dr. Sushil Dela Cruz FH: breast cancer in first degree relative     mother 58, 70. Micah De La Cruz allergy      Gómez Son. tested positive for wheat, rye, rice, gluten allergies    History of ITP 1998    reaction to claritin. tx gamma globulin    History of thromboembolism     2008 on OCP, 2012 while pregnant. now uses Xarelto prn travel    IUD (intrauterine device) in place 07/26/2018      Dr. Demetra Claire. mirena    Postpartum depression     Pulmonary emboli ([address] 09/2008    due to OCP    Right leg DVT ([address] 10/2012    while pregnant and traveling while taking low-dose lovenox.       Spontaneous vaginal delivery     4/29/13, 2010     Past Surgical History:   Procedure Laterality Date    HX CYST REMOVAL Left     left wrist ganglion    HX WISDOM TEETH EXTRACTION       Social History     Socioeconomic History    Marital status:      Spouse name: Devorah    Number of children: 2    Years of education: Not on file    Highest education level: Not on file   Occupational History    Occupation: [employer]      Employer: [address] [place] [address]   Tobacco Use    Smoking status: Never Smoker    Smokeless tobacco: Never Used   Substance and Sexual Activity    Alcohol use: No    Drug use: No    Sexual activity: Yes     Partners: Male     Birth control/protection: IUD     Family History   Problem Relation Age of Onset    Cancer Mother 64        breast(DCIS).   2x age 64, 71(63&72At time of lumpectomy)    Breast Cancer Maternal Aunt 61        Age 63-65/ 1x lumpectomy & chemo-\"aggressive type\"     Prostate Cancer Maternal Grandfather     Strabismus Paternal Grandfather     Stroke Paternal Grandfather     Breast Cancer Maternal Aunt 61        Late 60's \"triple negative\" genetic testing pending     Current Outpatient Medications   Medication Sig Dispense Refill    amoxicillin (AMOXIL) 875 mg tablet Take 875 mg by mouth two (2) times a day.  pseudoephedrine (SUDAFED) 30 mg tablet Take  by mouth every four (4) hours as needed for Congestion.  ibuprofen (IBUPAIN-200 PO) Take  by mouth.  XARELTO 20 mg tab tablet Take 1 Tab by mouth daily (with breakfast). As needed for travel. Per Dr. Arminda Trivedi. 30 Tab 0    azelastine-fluticasone (DYMISTA) 137-50 mcg/spray spry 1 Spray by Nasal route two (2) times a day. Allergies   Allergen Reactions    Claritin [Loratadine] Unknown (comments)     Pt had ITP in 1998. Pt stated that she believes it was from the claritin.      Ceftin [Cefuroxime Axetil] Nausea Only    Cefuroxime Nausea and Vomiting     Migraines    Percocet [Oxycodone-Acetaminophen] Nausea Only     Severe nausea       Review of Systems - General ROS: positive for  - fatigue, malaise, night sweats and sleep disturbance  Cardiovascular ROS: no chest pain or dyspnea on exertion  Respiratory ROS: no cough, shortness of breath, or wheezing    Visit Vitals  /82 (BP 1 Location: Left arm, BP Patient Position: Sitting)   Pulse 95   Temp 98 °F (36.7 °C) (Oral)   Resp 12   Ht 5' 6\" (1.676 m)   Wt 191 lb (86.6 kg)   SpO2 98%   BMI 30.83 kg/m²     General Appearance:  Well developed, well nourished,alert and oriented x 3, and individual in no acute distress. Ears/Nose/Mouth/Throat:   Hearing grossly normal.  Right tympanic membrane: fluid, left tympanic membrane fluid, no dentate line bilateral buccal mucosa         Neck: Supple, no lad, no bruits   Chest:   Lungs clear to auscultation bilaterally. Cardiovascular:  Regular rate and rhythm, S1, S2 normal, no murmur. Abdomen:   Soft, non-tender, bowel sounds are active. Extremities: No edema bilaterally. Skin: Warm and dry, no suspicious lesions                 Diagnoses and all orders for this visit:    1. Other acute nonsuppurative otitis media of both ears, recurrence not specified  Patient has persistent symptoms despite amoxicillin. I am concerned about some degree of resistance. We discussed cephalosporins but she has adverse reaction to this. There seems to be some resistance with Bactrim and Biaxin as well. Discussed with patient and given her symptoms she will try: Ibuprofen 600 mg every 6 hours for 2 days, nasal irrigation, Dymista and levoFLOXacin (LEVAQUIN) 500 mg tablet; Take 1 Tab by mouth daily. If no improvement she will see ENT.

## 2018-11-28 NOTE — PATIENT INSTRUCTIONS

## 2018-11-30 RX ORDER — PREDNISONE 20 MG/1
TABLET ORAL
Qty: 5 TAB | Refills: 0 | Status: SHIPPED | OUTPATIENT
Start: 2018-11-30 | End: 2018-11-30

## 2018-11-30 RX ORDER — PREDNISONE 20 MG/1
TABLET ORAL
Qty: 5 TAB | Refills: 0 | Status: SHIPPED | OUTPATIENT
Start: 2018-11-30